# Patient Record
Sex: MALE | Race: WHITE | NOT HISPANIC OR LATINO | Employment: FULL TIME | URBAN - METROPOLITAN AREA
[De-identification: names, ages, dates, MRNs, and addresses within clinical notes are randomized per-mention and may not be internally consistent; named-entity substitution may affect disease eponyms.]

---

## 2018-01-28 ENCOUNTER — HOSPITAL ENCOUNTER (EMERGENCY)
Facility: HOSPITAL | Age: 23
Discharge: HOME | End: 2018-01-28
Attending: EMERGENCY MEDICINE

## 2018-01-28 VITALS
SYSTOLIC BLOOD PRESSURE: 145 MMHG | HEART RATE: 98 BPM | DIASTOLIC BLOOD PRESSURE: 77 MMHG | RESPIRATION RATE: 14 BRPM | WEIGHT: 210 LBS | TEMPERATURE: 101 F

## 2018-01-28 DIAGNOSIS — J06.9 UPPER RESPIRATORY TRACT INFECTION, UNSPECIFIED TYPE: Primary | ICD-10-CM

## 2018-01-28 LAB
DEPRECATED S PYO AG THROAT QL EIA: NEGATIVE
FLUAV AG SPEC QL IA: NEGATIVE
FLUBV AG SPEC QL IA: NEGATIVE
SPECIMEN SOURCE: NORMAL

## 2018-01-28 PROCEDURE — 87400 INFLUENZA A/B EACH AG IA: CPT

## 2018-01-28 PROCEDURE — 87880 STREP A ASSAY W/OPTIC: CPT

## 2018-01-28 PROCEDURE — 87081 CULTURE SCREEN ONLY: CPT

## 2018-01-28 PROCEDURE — 99283 EMERGENCY DEPT VISIT LOW MDM: CPT

## 2018-01-28 NOTE — ED PROVIDER NOTES
[facility]                                                  Chief Complaint  22 y.o. male with Fever and Sore Throat    History of Present Illness  Jhonathan Mitchell presents to the emergency room with complaints of cough, congestion, fever, sore throat.  Patient says he feels pretty well but has needed at home and wants to get tested for strep and flu syndrome he does not give it to them.  Do not take any medication for symptoms.      History reviewed. No pertinent past medical history.  History reviewed. No pertinent surgical history.   Review of patient's allergies indicates:  No Known Allergies     Review of Systems and Physical Exam     Review of Systems  -- Constitution -reports fever, denies fatigue, no weakness, no chills  -- Eyes - no tearing or redness, no visual disturbance  -- Ear, Nose - no tinnitus or earache, no nasal congestion or discharge  -- Mouth,Throat -reports sore throat, no toothache, normal voice, normal swallowing  -- Respiratory -reports cough and congestion, no shortness of breath, no wheezing  -- Cardiovascular - denies chest pain, no palpitations, denies claudication  -- Gastrointestinal - denies abdominal pain, nausea, vomiting, or diarrhea  -- Genitourinary - no dysuria, no denies flank pain, no hematuria or frequency   -- Musculoskeletal - denies back pain, negative for myalgias and arthralgias   -- Neurological - no headache, denies weakness or seizure; no LOC  -- Skin - denies pallor, rash, or changes in skin. no hives or welts noted    Vital Signs   weight is 95.3 kg (210 lb). His oral temperature is 100.8 °F (38.2 °C) (abnormal). His blood pressure is 134/76 and his pulse is 103. His respiration is 15.      Physical Exam  -- Nursing note and vitals reviewed  -- Constitutional: Appears well-developed and well-nourished  -- Head: Atraumatic. Normocephalic. No obvious abnormality  -- Eyes: Pupils are equal and reactive to light. Normal conjunctiva and lids  --  Nose: Nose normal in appearance, nares grossly normal. No discharge  -- Throat: Mucous membranes moist, pharynx normal, normal tonsils. No lesions   -- Ears: External ears and TM normal bilaterally. Normal hearing and no drainage  -- Neck: Normal range of motion. Neck supple. No masses, trachea midline  -- Cardiac: Normal rate, regular rhythm and normal heart sounds  -- Pulmonary: Normal respiratory effort, breath sounds clear to auscultation  -- Abdominal: Soft, no tenderness. Normal bowel sounds. Normal liver edge  -- Musculoskeletal: Normal range of motion, no effusions. Joints stable   -- Neurological: No focal deficits. Showed good interaction with staff  -- Vascular: Posterior tibial, dorsalis pedis and radial pulses 2+ bilaterally    -- Lymphatics: No cervical or peripheral lymphadenopathy. No edema noted  -- Skin: Warm and dry. No evidence of rash or cellulitis  -- Psychiatric: Normal mood and affect. Bedside behavior is appropriate    Emergency Room Course     Treatment and Evaluation  1.  Physical exam unremarkable   2.  Rapid strep negative  3.  Rapid flu negative  4.  DC home follow up PCP        Abnormal lab values  Labs Reviewed   THROAT SCREEN, RAPID   CULTURE, STREP A,  THROAT   INFLUENZA A AND B ANTIGEN           Diagnosis  -- URI    Disposition and Plan  -- Disposition: home  -- Condition: stable  -- Follow-up: Patient to follow up with Primary Doctor No in 1-2 days.  -- I advised the patient that we have found no life threatening condition today  -- At this time, I believe the patient is clinically stable for discharge.   -- The patient acknowledges that close follow up with a MD is required   -- Patient agrees to comply with all instruction and direction given in the ER  -- Patient counseled on strict return precautions as discussed       Jessica Cadena MD  01/28/18 [id number]

## 2018-01-28 NOTE — DISCHARGE INSTRUCTIONS
You have been diagnosed with an upper respiratory infection.  Your flu test and you are strep tests are negative, however you still can give your virus to other people including children.  Make sure that you do not cough directly on anyone and wash your hands often.  You may take ibuprofen and Tylenol for symptoms such as fever, sore throat, headache, body aches.  You should follow up with your primary care doctor first available

## 2018-01-28 NOTE — ED NOTES
Discharged to home/self care.    - Condition at discharge: Good  - Mode of Discharge: Ambulatory  - The discharge instructions were discussed with the patient.  - They state an understanding of the discharge instructions.  - Walked pt to the discharge station.  Advised on proper hand hygiene to prevent transmission of viral illness. Patient verbalized understanding

## 2018-01-30 LAB — BACTERIA THROAT CULT: NORMAL
